# Patient Record
Sex: FEMALE | Race: WHITE | NOT HISPANIC OR LATINO | Employment: STUDENT | URBAN - METROPOLITAN AREA
[De-identification: names, ages, dates, MRNs, and addresses within clinical notes are randomized per-mention and may not be internally consistent; named-entity substitution may affect disease eponyms.]

---
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
## 2018-03-08 ENCOUNTER — TELEPHONE (OUTPATIENT)
Dept: SURGERY | Facility: CLINIC | Age: 18
End: 2018-03-08

## 2018-03-08 NOTE — TELEPHONE ENCOUNTER
Patient was no show for imaging 3/5/18 [facility]  Need to reschedule along w/follow up here  I had to leave a message for patient to call us back to reschedule.     lml

## 2018-04-03 ENCOUNTER — TELEPHONE (OUTPATIENT)
Dept: SURGERY | Facility: CLINIC | Age: 18
End: 2018-04-03

## 2018-04-03 NOTE — TELEPHONE ENCOUNTER
Ms. Long called and rescheduled [facility]/s Bapt Rosalie 5-30-18 @ 9:30  Return to see Dr LIM 6-11-18 arrive 11:45    kdl

## 2018-05-30 ENCOUNTER — HOSPITAL ENCOUNTER (OUTPATIENT)
Dept: ULTRASOUND IMAGING | Facility: HOSPITAL | Age: 18
Discharge: HOME | End: 2018-05-30
Admitting: SURGERY

## 2018-05-30 DIAGNOSIS — N63.0 LUMP OR MASS IN BREAST: ICD-10-CM

## 2018-05-30 PROCEDURE — 76642 ULTRASOUND BREAST LIMITED: CPT

## 2018-06-08 ENCOUNTER — TELEPHONE (OUTPATIENT)
Dept: SURGERY | Facility: CLINIC | Age: 18
End: 2018-06-08

## 2018-06-08 NOTE — TELEPHONE ENCOUNTER
[facility]  right breast ultrasound dated May 30, 2018: Patient has palpable abnormality inferior right breast for one year.  Targeted ultrasound imaging at the site of palpable complaint inferior right breast near 6:00.  No cystic or solid nodular distortion is seen.  BI-RADS 1.

## 2018-06-11 ENCOUNTER — OFFICE VISIT (OUTPATIENT)
Dept: SURGERY | Facility: CLINIC | Age: 18
End: 2018-06-11

## 2018-06-11 DIAGNOSIS — Z80.3 FH: BREAST CANCER: ICD-10-CM

## 2018-06-11 DIAGNOSIS — N63.0 LUMP OR MASS IN BREAST: Primary | ICD-10-CM

## 2018-06-11 PROCEDURE — 99212 OFFICE O/P EST SF 10 MIN: CPT | Performed by: SURGERY

## 2018-06-11 NOTE — PROGRESS NOTES
Chief Complaint: Sravani Long is a 17 y.o. female who was seen in consultation at the request of No ref. provider found  for breast mass right breast     History of Present Illness:    Sravani is an 16-year-old woman who noted a right breast mass since May 2017.  She tells me that she can feel this in the sitting position but not in the supine position.  She does not say that the mass is tender.  She says that there has not been any change.  Her imaging includes [facility] ultrasound right breast August 3, 2017 ultrasound was negative for any finding at the area of concern in the inferior right breast.  She takes no birth control at this time.  Her family history significant for a maternal grandmother who had breast cancer age uncertain.     She noted no other new masses, skin changes, nipple discharge, nipple changes prior to her most recent imaging.  She has not had a breast biopsy in the past.    Interval HIstory:  In the interim,  Sravani Long  has done well.  She has noted no changes in her breast exam. No new masses, skin changes, nipple changes, nipple discharge either breast.   Specifically she has noted no changes in her right breast exam.    Her most recent imaging includes the following:  [facility]  right breast ultrasound dated May 30, 2018: Patient has palpable abnormality inferior right breast for one year.  Targeted ultrasound imaging at the site of palpable complaint inferior right breast near 6:00.  No cystic or solid nodular distortion is seen.  BI-RADS 1.    Review of Systems:  Review of Systems   All other systems reviewed and are negative.       Past Medical and Surgical History:  Breast Biopsy History:  Patient has not had a breast biopsy in the past.  Breast Cancer HIstory:  Patient does not have a past medical history of breast cancer.  Breast Operations, and year:  None   Obstetric/Gynecologic History:  Age menstrual periods began: 11  Patient is premenopausal,  first day of last period: 8/3/17  Number of pregnancies:0  Number of live births: 0  Number of abortions or miscarriages: 0  Age of delivery of first child: n/a   Breast feeding: n/a   Length of time taking birth control pills: n/a   Patient has never taken hormone replacement  Patient has uterus and ovaries     Past Surgical History:   Procedure Laterality Date   • TONSILLECTOMY         No past medical history on file.    Prior Hospitalizations, other than for surgery or childbirth, and year:  None     Social History     Social History   • Marital status: Single     Spouse name: N/A   • Number of children: N/A   • Years of education: N/A     Occupational History   • Not on file.     Social History Main Topics   • Smoking status: Never Smoker   • Smokeless tobacco: Never Used   • Alcohol use No   • Drug use: No   • Sexual activity: No     Other Topics Concern   • Not on file     Social History Narrative   • No narrative on file     Patient is single.  Patient is a student.  Patient does not drink caffeine.    Family History:  Family History   Problem Relation Age of Onset   • Hypertension Father    • Hypertension Mother    • Hypothyroidism Mother    • No Known Problems Brother    • Diabetes Paternal Grandfather    • Hypertension Paternal Grandmother    • Hypertension Maternal Grandmother    • Breast cancer Maternal Grandmother    • Diabetes Maternal Grandmother    • Hypertension Maternal Grandfather        Vital Signs:  There were no vitals taken for this visit.     Medications:  No current outpatient prescriptions on file.     Allergies:  No Known Allergies    Physical Examination:  There were no vitals taken for this visit.  General Appearance:  Patient is in no distress.  She is well kept and has an thin build.   Psychiatric:  Patient with appropriate mood and affect. Alert and oriented to self, time, and place.    Breast, RIGHT:  small sized, symmetric with the contralateral side.  Breast skin is without  erythema, edema, rashes.  There are no visible abnormalities upon inspection during the arm-raising maneuver or with hands on hips in the sitting position. There is no nipple retraction, discharge or nipple/areolar skin changes. At the right breast 5:30, 4.5 cm from the nipple location the focally thickened area of glandular tissue is stable on examination. More prominent in the sitting versus supine position. Stable.  There are no skin changes in this region and there is no tenderness.    Breast, LEFT:  small sized, symmetric with the contralateral side.  Breast skin is without erythema, edema, rashes.  There are no visible abnormalities upon inspection during the arm-raising maneuver or with hands on hips in the sitting position. There is no nipple retraction, discharge or nipple/areolar skin changes.There are no masses palpable in the sitting or supine positions.    Lymphatic:  There is no axillary, cervical, infraclavicular, or supraclavicular adenopathy bilaterally.  Eyes:  Pupils are round and reactive to light.  Cardiovascular:  Heart rate and rhythm are regular.  Respiratory:  Lungs are clear bilaterally with no crackles or wheezes in any lung field.  Gastrointestinal:  Abdomen is soft, nondistended, and nontender. There are no scars from previous surgery.    Musculoskeletal:  Good strength in all 4 extremities.   There is good range of motion in both shoulders.    Skin:  No new skin lesions or rashes on the skin excluding the breast (see breast exam above).        Imagin/3/17  [facility]  ULTRASOUND RIGHT BREAST QUYEN MARTINEZ  HISTORY: Palpable nodularity in the inferior right breast for about 3 months.  IMPRESSION: Negative right breast ultrasound examination.    [facility]  right breast ultrasound dated May 30, 2018: Patient has palpable abnormality inferior right breast for one year.  Targeted ultrasound imaging at the site of palpable complaint inferior right breast near 6:00.  No cystic or  solid nodular distortion is seen.  BI-RADS 1.    Procedures:      Assessment:   Diagnosis Plan   1. Lump or mass in breast     2. FH: breast cancer     1-RIGHT breast 5:30 4.5 CFN - thickening on exam- stable from August 2017 through June 2018 on both imaging and exam.   2- MGM age uncertain    Plan:  Sravani and I reviewed her interval history, examination, imaging and imaging reports together today.  The focal exam finding is unchanged which is reassuring.  Her imaging has been stable and negative for approx a year.    Since her imaging has shown no focal findings, I will plan to see her back in 1 year with no imaging.  If her exam is stable at the 2 year follow-up dannie, then he can she can return to routine screening.      I have asked her to continue her self breast exam and to call us in the interim with any concerns and we’d be happy to see her back sooner.      Hazel Levin MD          Next Appointment:  Return in about 1 year (around 6/11/2019) for no imaging.      EMR Dragon/transcription disclaimer:    Much of this encounter note is an electronic transcription/translocation of spoken language to printed text.  The electronic translation of spoken language may permit erroneous, or at times, nonsensical words or phrases to be inadvertently transcribed.  Although I have reviewed the note from such areas, some may still exist.

## 2019-06-13 ENCOUNTER — TELEPHONE (OUTPATIENT)
Dept: SURGERY | Facility: CLINIC | Age: 19
End: 2019-06-13

## 2019-06-13 NOTE — TELEPHONE ENCOUNTER
Patient's mother called to cancel appt tomorrow; Friday 6/14/19 due to [place] being out of town.     Patient will call back to reschedule. [email or web address]

## 2019-07-11 ENCOUNTER — TELEPHONE (OUTPATIENT)
Dept: SURGERY | Facility: CLINIC | Age: 19
End: 2019-07-11

## 2019-07-11 NOTE — TELEPHONE ENCOUNTER
Called Sravani, spoke to her Mother to see if she is ready to   reschedule her appt with Dr LIM    Per her mother she will have her call us.    luis

## 2019-09-19 ENCOUNTER — TELEPHONE (OUTPATIENT)
Dept: SURGERY | Facility: CLINIC | Age: 19
End: 2019-09-19